# Patient Record
Sex: MALE | Race: WHITE | HISPANIC OR LATINO | Employment: FULL TIME | URBAN - METROPOLITAN AREA
[De-identification: names, ages, dates, MRNs, and addresses within clinical notes are randomized per-mention and may not be internally consistent; named-entity substitution may affect disease eponyms.]

---

## 2021-08-17 DIAGNOSIS — Z96.21 COCHLEAR IMPLANT FOLLOW-UP: Primary | ICD-10-CM

## 2021-08-17 DIAGNOSIS — Z48.89 COCHLEAR IMPLANT FOLLOW-UP: Primary | ICD-10-CM

## 2021-08-18 ENCOUNTER — OFFICE VISIT (OUTPATIENT)
Dept: AUDIOLOGY | Facility: CLINIC | Age: 41
End: 2021-08-18
Payer: COMMERCIAL

## 2021-08-18 DIAGNOSIS — H90.3 SENSORINEURAL HEARING LOSS, BILATERAL: Primary | ICD-10-CM

## 2021-08-18 PROCEDURE — 99207 PR NO CHARGE LOS: CPT | Performed by: AUDIOLOGIST

## 2021-08-18 PROCEDURE — 92557 COMPREHENSIVE HEARING TEST: CPT | Mod: 52 | Performed by: AUDIOLOGIST

## 2021-08-18 PROCEDURE — 92550 TYMPANOMETRY & REFLEX THRESH: CPT | Mod: 52 | Performed by: AUDIOLOGIST

## 2021-08-23 NOTE — PROGRESS NOTES
AUDIOLOGY REPORT    SUBJECTIVE:  Gary Coreas is a 41 year old male who was seen in the Audiology Clinic at the [facility] for audiologic evaluation, referred by Trey Garcia M.D.  The patient reports a history of bilateral hearing loss since he was born. He uses a cochlear implant in his right ear and a Miracle Ear behind-the-ear-hearing aid in his left ear. Gary was surgically implanted with a right Advanced Bionics HiRes 90K cochlear implant by Dr. Eben Mai on 11/27/12. Gary was previously seen at [facility]. He reports he is transferring care to [facility] due to his insurance. Gary states that his cochlear implant processor broke and he lost it a couple of months ago. He is interested in ordering an upgraded processor and getting a new hearing aid that works with his cochlear implant.     OBJECTIVE:  Otoscopic exam indicates ears are clear of cerumen bilaterally     Pure Tone Thresholds assessed using conventional audiometry with good  reliability from 250-8000 Hz bilaterally using circumaural headphones     RIGHT:  profound sensorineural hearing loss (no response at any frequency)    LEFT:    severe sloping to profound rising to moderately-severe sensorineural hearing loss    Tympanogram:    RIGHT: normal eardrum mobility    LEFT:   normal eardrum mobility    Reflexes (reported by stimulus ear):  RIGHT: Ipsilateral is absent at frequencies tested  RIGHT: Contralateral was not tested  LEFT:   Ipsilateral is absent at frequencies tested  LEFT:   Contralateral was not tested      Speech Reception Threshold:    RIGHT: DNT    LEFT:   85 dB HL  Word Recognition Score:     RIGHT: DNT     LEFT:   40% at 100 dB HL using NU-6 recorded word list    Cochlear Implant Upgrade: Gary is interested in upgrading to a new cochlear implant processor for his right ear. A [employer] CI M90 Upgrade order form is started with Gary during the appointment. He selects a  processor in color chestnut (P4). He is unsure what he would like to choose for his accessory option. After Gary speaks with a representative from [employer] he decides he would like to get the Phonak Amalia Link M hearing aid for his left ear as his accessory option.    ASSESSMENT:   Today's results reveal profound sensorineural hearing loss in the right ear and severe to profound rising to moderately-severe sensorineural hearing loss in the left ear. The processor upgrade process is started for his right cochlear implant processor through [employer].    PLAN:  Gary should be scheduled to return for a cochlear implant upgrade programming once his upgraded processor has been received. Please call this clinic with questions regarding these results or recommendations.      Norbert Mena., [facility]-A  [place] Licensed Audiologist #[id number]

## 2022-01-07 NOTE — PROGRESS NOTES
AUDIOLOGY REPORT     SUBJECTIVE: Gary Coreas was seen at the [facility] for a cochlear implant processor upgrade. He is also being fit with a left Phonak Amalia Link M hearing aid which he chose as his free accessory. Gary reports a history of bilateral hearing loss since he was born. Gary was surgically implanted with a right Advanced Bionics HiRes 90K cochlear implant by Dr. Eben Mai on 11/27/12. Gary was previously seen at a [facility]. He is transferring care to [facility] due to his insurance. He previously utilized a cochlear implant at the right ear and a Miracle Ear behind-the-ear-hearing aid at the left ear. Gary has not worn his right processor in 6 months as he lost it.      Carly, the representative from [employer], was present for today's appointment.    OBJECTIVE: Gary was fit with an upgraded right processor then the hearing aid. The cochlear implant and the hearing aid were connected to the software and were programmed in the same session.     Cochlear Implant: Right, Amalia CI M90 Sound Processor (S/N: [id number])  Hearing Aid: Left, Phonak Amalia Link M (S/N: [id number])    External equipment on the right was connected to programming software (magnet strength 4). There is no programming information available in [employer]. Therefore, initial mapping occurred in the [employer] CI software. Input dynamic range was increased to 75. The electrode array was conditioned and electrode impedances were appropriate. Initial programming adjustments were made including loudness growth and loudness balancing of electrodes across the array. Progressive levels of 5 clinical units were enabled.     Visual inspection of the left pinna revealed a sore above the tragus. Gary reports the sore is from his earmold. His current earmold was re-tubed and was trimmed to fit the Phonak Amalia Link M hearing aid. I recommended that Gary let the sore on his  ear heal. He expressed interest in returning for a smaller earmold to be made for the left ear.    Per Carly's recommendation, real ear measures were not performed as a bimodal fitting formula is not available on the [employer]. Instead, the algorithm in the software utilizes the settings and compression ratios that are in the cochlear implant to determine the best fit for the hearing aid. Bluetooth is enabled to stream to the right CI. Manual volume control is active on the left hearing aid. Indicator sounds were played for him. If Gary presses the up button on the right processor, he will be changing the progressive level of the map. Gary reported satisfaction with the sound quality and reported that sound was balanced between his ears. Both devices were connected to his bluetooth and the AB Remote Leon. A phone call was practiced in the office and Gary pressed the buttons on his processor to answer/end the call with success.    Battery life with a standard battery is estimated at 16+ hours. The medium (curved) batteries are back ordered and will be shipped to this clinic when they are available. He is able to keep the standard batteries as a courtesy.     ASSESSMENT: The right Advanced Bionics processor has been upgraded and programmed along with the left Phonak Amalia M Link hearing aid. Gary is satisfied with the sound quality of both devices today. He is not charged for the hearing aid as I was unaware of the fitting procedure and charges when fitting a hearing aid with a cochlear implant. He is billed for cochlear implant programming.       PLAN: Gary is scheduled to return on 2/10/22 for an earmold impression of the left ear. He is encouraged to push back his appointment if his ear has not yet healed. It is recommended that he return for additional programming adjustments in 1-2 months. Please call this clinic at [phone] with questions regarding these results or recommendations.     Liane  Matt Cote, [facility]-A  Clinical Audiologist   [place] #[id number]

## 2022-01-13 ENCOUNTER — OFFICE VISIT (OUTPATIENT)
Dept: AUDIOLOGY | Facility: CLINIC | Age: 42
End: 2022-01-13
Payer: COMMERCIAL

## 2022-01-13 DIAGNOSIS — H90.3 SENSORINEURAL HEARING LOSS, BILATERAL: Primary | ICD-10-CM

## 2022-01-13 PROCEDURE — 92603 COCHLEAR IMPLT F/UP EXAM 7/>: CPT | Performed by: AUDIOLOGIST

## 2022-01-13 PROCEDURE — 99207 PR NO CHARGE LOS: CPT | Performed by: AUDIOLOGIST

## 2022-02-10 ENCOUNTER — OFFICE VISIT (OUTPATIENT)
Dept: AUDIOLOGY | Facility: CLINIC | Age: 42
End: 2022-02-10
Payer: COMMERCIAL

## 2022-02-10 DIAGNOSIS — H90.3 SENSORINEURAL HEARING LOSS, BILATERAL: Primary | ICD-10-CM

## 2022-02-10 PROCEDURE — V5275 EAR IMPRESSION: HCPCS | Mod: LT | Performed by: AUDIOLOGIST

## 2022-02-10 PROCEDURE — 99207 PR NO CHARGE LOS: CPT | Performed by: AUDIOLOGIST

## 2022-02-10 NOTE — PROGRESS NOTES
AUDIOLOGY REPORT    SUBJECTIVE: Gary Coreas, 41 year old year old male, was seen at the [facility] on 02/10/22 for an earmold impression. Gary reports a history of bilateral hearing loss since he was born. Gary was surgically implanted with a right Advanced Bionics HiRes 90K cochlear implant by Dr. Eben Mai on 11/27/12. Gary was previously seen at a [facility]. He is transferring care to [facility] due to his insurance. He previously utilized a cochlear implant at the right ear and a Miracle Ear brand behind-the-ear-hearing aid at the left ear. He was fit with an upgraded Amalia M90 cochlear implant processor at the right ear and a Phonak Amalia Link M hearing aid at the left ear on 1/13/22.     Today, Gary reports that the left earmold is too loose.     OBJECTIVE: Otoscopy revealed a clear left canal. A left earmold impression was taken without incident.     Earmold ordered:   : Unitron  Ear: Left  Material: Silicone  Style: Carved full shell  Vent: Small  Canal length: Long  Addition: Removal cord    ASSESSMENT: Hearing aid check completed. Billed for left earmold impression.     PLAN: Gary is scheduled to return on 3/8/22 with Yanelis Peoples, to be fit with the left earmold. Please call our clinic at [phone] with questions or concerns.    Matt Singer, CCC-A  Clinical Audiologist   [place] #[id number]

## 2022-03-07 NOTE — PROGRESS NOTES
AUDIOLOGY REPORT     SUBJECTIVE: Gary Coreas, 41 year old year old male, was seen at the [facility] on 03/8/22 for an earmold fitting appointment. Gary has a history of bilateral hearing loss since he was born. Currently wears a  Left hearing aid, (Phonak Amalia Link M), and a right cochlear implant (CI), (Amalia CI M90 Sound Processor).      OBJECTIVE: Otoscopy revealed a clear left canal. Fit today with new left earmold. Tubing was trimmed appropriately, patient was pleased with the fit.      Patient is wondering if his CI batteries are in. Checked with another audiologist and our audiology assistant, CI batteries have not yet been received. January 2022 audiology note says batteries are back ordered. Will ship batteries to patient when they arrive.      Earmold:  : Unitron  Ear: Left  Material: Silicone  Style: Carved full shell  Vent: Small  Canal length: Long  Addition: Removal cord     ASSESSMENT: Hearing aid check completed.      PLAN: Gary will return as needed. Please ship CI batteries to Gary when they arrive. Please call our clinic at [phone] with questions or concerns.

## 2022-03-08 ENCOUNTER — OFFICE VISIT (OUTPATIENT)
Dept: AUDIOLOGY | Facility: CLINIC | Age: 42
End: 2022-03-08
Payer: COMMERCIAL

## 2022-03-08 DIAGNOSIS — H90.3 SENSORINEURAL HEARING LOSS, BILATERAL: Primary | ICD-10-CM

## 2022-03-08 PROCEDURE — 99207 PR NO CHARGE LOS: CPT | Performed by: AUDIOLOGIST

## 2022-03-08 PROCEDURE — V5264 EAR MOLD/INSERT: HCPCS | Mod: NU | Performed by: AUDIOLOGIST

## 2023-03-08 ENCOUNTER — ALLIED HEALTH/NURSE VISIT (OUTPATIENT)
Dept: AUDIOLOGY | Facility: CLINIC | Age: 43
End: 2023-03-08
Payer: COMMERCIAL

## 2023-03-08 DIAGNOSIS — H90.3 SENSORINEURAL HEARING LOSS, BILATERAL: Primary | ICD-10-CM

## 2023-03-08 PROCEDURE — V5010 ASSESSMENT FOR HEARING AID: HCPCS | Performed by: AUDIOLOGIST

## 2023-03-08 NOTE — PROGRESS NOTES
"Patient dropped off left hearing aid due to \" ear hook crack at end where ear mold is attached\". Upon visual inspection, the tone hook was broken. A new tone hook was placed on hearing aid and tubing changed. Hearing aid was cleaned, microphones brushed and vacuumed, wax removed from earmold. A new size 13 battery was provided and listening check revealed proper functioning.    Called Gary to let them know the hearing aid is ready for pickup, clinic hours and location were provided.    No charge, hearing aid still in warranty: 1/25/25.     Gabriela Hwang, Audiology Clinic Assistant       I delegated the hearing aid service to the audiology assistant. I approve of the services provided.  Matt Peoples, CCC-A  [place] Licensed Audiologist #[id number]    "

## 2023-04-16 ENCOUNTER — HEALTH MAINTENANCE LETTER (OUTPATIENT)
Age: 43
End: 2023-04-16

## 2023-06-20 ENCOUNTER — OFFICE VISIT (OUTPATIENT)
Dept: AUDIOLOGY | Facility: CLINIC | Age: 43
End: 2023-06-20
Payer: COMMERCIAL

## 2023-06-20 DIAGNOSIS — H90.3 SENSORINEURAL HEARING LOSS, BILATERAL: Primary | ICD-10-CM

## 2023-06-20 PROCEDURE — 92604 REPROGRAM COCHLEAR IMPLT 7/>: CPT | Performed by: AUDIOLOGIST

## 2023-06-20 NOTE — PROGRESS NOTES
AUDIOLOGY REPORT    SUBJECTIVE: Gary Coreas is a 43 year old male was seen in the Audiology Clinic at [facility] on 23. Dr. Eben Mai from [facility] implanted Gary with a right  Advanced Bionics 90K Advantage cochlear implant (CI) on the right side due to moderately-severe to severe sensorineural hearing loss in the left ear and profound rising to moderately-severe sensorineural hearing loss in the right ear and lack of benefit from hearing aids. Gary was implanted on 2012. He transferred care to this clinic in . Gary was fit with a left Phonak Amalia Link M hearing aid and he was upgraded to a right Advanced Bionics Amalia CI M90 sound processor at this clinic on 22.    The patient reports that he would like some manual programs added to his hearing aid and cochlear implant processor. Gary states that he has contacted [employer] to request a replacement rechargeable battery a couple of times but he hasn't received one yet.    OBJECTIVE: The patient came to the clinic for adjustment to the programs in the external speech processor and for assessment of the external components of the cochlear implant system. These components provide power and data to the internal device. Sound is only heard once the external portion is activated. Postoperative treatment, including device fitting and adjustment, audiologic assessments, and training are required at regular intervals. Testing can include electropsychophysical measures of threshold, comfort, and loudness balancing, which are completed to update the program.    Processor type: Right: Amalia CI M90 Sound Processor (S/N: [id number])  Magnet strength: 4  Hearing Aid: Left: Phonak Amalia Link M (S/N: [id number])    TEST RESULTS:   Tympanograms: DNT due to lack of time  Electrode Impedances: stable and within tolerances  Dataloggin.5 hours of use per day  Neural Response Testing: DNT  Strategy  Preference: HiRes Gautier-S    Programs:  1. AutoSense OS 3.0  2. Speech in 360 with direct touch  3. Acoustic phone  4. Music 1    ASSESSMENT: Three manual programs were added to Gary's cochlear implant and hearing aid today. He is able to access the programs in the Advanced Bionics karyn or by pressing and holding the button on his hearing aid or CI. Gary doesn't want any other volume adjustments made to his hearing aid or cochlear implant today. [employer] will be contacted to request a replacement rechargeable battery for him.    PLAN: Gary was seen for cochlear implant programming today. He will return for speech perception testing with his cochlear implant and hearing aid on 9/5/23 or sooner if concerns arise. Please call this clinic with questions regarding these results or recommendations.    Norbert Mena., [facility])-A  [place] Licensed Audiologist #[id number]

## 2023-09-05 ENCOUNTER — OFFICE VISIT (OUTPATIENT)
Dept: AUDIOLOGY | Facility: CLINIC | Age: 43
End: 2023-09-05
Payer: COMMERCIAL

## 2023-09-05 DIAGNOSIS — H90.3 SENSORINEURAL HEARING LOSS, BILATERAL: Primary | ICD-10-CM

## 2023-09-05 PROCEDURE — 92567 TYMPANOMETRY: CPT | Performed by: AUDIOLOGIST

## 2023-09-05 PROCEDURE — 92626 EVAL AUD FUNCJ 1ST HOUR: CPT | Performed by: AUDIOLOGIST

## 2023-09-05 PROCEDURE — 92553 AUDIOMETRY AIR & BONE: CPT | Mod: 52 | Performed by: AUDIOLOGIST

## 2023-09-05 NOTE — PROGRESS NOTES
AUDIOLOGY REPORT    SUBJECTIVE:Gary Coreas is a 43 year old male who was seen in the Audiology Clinic at [facility] on September 5, 2023. Dr. Eben Mai from [facility] implanted Gary with a right  Advanced Bionics 90K Advantage cochlear implant (CI) on the right side due to moderately-severe to severe sensorineural hearing loss in the left ear and profound rising to moderately-severe sensorineural hearing loss in the right ear and lack of benefit from hearing aids. Gary was implanted on 11/27/2012. He transferred care to this clinic in 2021. Gary was fit with a left Phonak Amalia Link M hearing aid and he was upgraded to a right Advanced Bionics Amalia CI M90 sound processor at this clinic on 1/13/22.     The patient reports that his hearing aid and cochlear implant have been working well since his last adjustment on 6/20/23. He denies issues with his hearing aid or cochlear implant. He reports that he received replacement batteries for his cochlear implant.    OBJECTIVE:    Speech perception testing is conducted at regular intervals to determine the degree of benefit the patient is obtaining from the cochlear implant. Tests are conducted using the cochlear implant without the benefit of lipreading. All tests are conducted in a sound-treated room. Perception of monosyllabic words and words in sentences are tested. Results usually show improvement over time. A decrease in performance indicates the need for re-programming of the prosthesis and/or replacement of component    INTERVAL: 10.5 years     45 minutes were spent assessing the patient s auditory rehabilitation status.    Device(s) used for Testing:   Right ear: Advanced Bionics Amalia CI M90  Left ear: Phonak Amalia Link M    Soundfield Aided Thresholds: Left: detection in the mild sloping to profound rising to severe hearing loss range, Right: detection in the normal to borderline-normal hearing  range  Unaided Thresholds: Left: severe sloping to profound rising to moderately-severe sensorineural hearing loss, Right: did not test due to no responses in that ear documented on 8/18/21.  Tympanograms: normal eardrum mobility bilaterally.    CNC Words Test:  The patient repeats 25 single syllable words, auditory only. The words are presented at 60 dB A (conversational level) delivered from a CD player.    Preoperative Performance:  Left ear aided: 24%  Right ear aided: 18%  Bilaterally aided: 28%    3 months Post-Activation of CI:   Right: 26%    6 months Post-Activation of CI:   Right: 26%    12 months Post-Activation of CI:   Right: 44%    10.5 Years Post-Activation of CI:   Left: 20%  Right: 84%  Bilateral/Bimodal: 84%    AzBio Sentences Test:  The patient repeats 20 sentences, auditory only.  The sentences are presented at 60 dB A (conversational level) delivered from a CD player.     Preoperative Performance:  Left ear aided: 59% in quiet, 25% with +5 SNR  Right ear aided: 39% in quiet, 12% with +5 SNR  Bilaterally aided: 65% in quiet, 15% with +5 SNR    3 months Post-Activation of CI:   Right: 51% in quiet, 10% with +5 SNR  Bilateral/Bimodal: 78% in quiet, 39% with +5 SNR    6 months Post-Activation of CI:   Right: 44% in quiet, 10% with +5 SNR  Bilateral/Bimodal: 83% in quiet, 34% with +5 SNR    12 months Post-Activation of CI:   Right: 62% in quiet, 20% with +5 SNR  Bilateral/Bimodal: 82% in quiet, 50% with +5 SNR    10.5 years Post-Activation of CI:   Right: 62% in quiet, 34% with +5 SNR  Left: 36% in quiet  Bilateral/Bimodal: 82% in quiet, 33% with +5 SNR    COMMENTS: Due to the good das performance, no programming changes were advised.    ASSESSMENT:   -Full time user of right CI and left hearing aid.   -CNC scores have improved and AzBio scores have remained stable in quiet compared to previous tests. AzBio scores with noise improved in the CI only condition and declined in the bimodal  condition.  -Stable left hearing compared to results from 8/18/21.      PLAN: The patient will return for speech perception testing in 1-2 years. Gary should return for cochlear implant programming and hearing aid checks as needed. The patient expressed understanding and agreement with this plan.      Norbert Mena., [facility]-A  [place] Licensed Audiologist #[id number]

## 2024-06-23 ENCOUNTER — HEALTH MAINTENANCE LETTER (OUTPATIENT)
Age: 44
End: 2024-06-23

## 2024-07-31 ENCOUNTER — ALLIED HEALTH/NURSE VISIT (OUTPATIENT)
Dept: AUDIOLOGY | Facility: CLINIC | Age: 44
End: 2024-07-31
Payer: COMMERCIAL

## 2024-07-31 DIAGNOSIS — H90.3 SENSORINEURAL HEARING LOSS, BILATERAL: Primary | ICD-10-CM

## 2024-07-31 PROCEDURE — V5299 HEARING SERVICE: HCPCS | Performed by: AUDIOLOGIST

## 2024-07-31 NOTE — PROGRESS NOTES
"HEARING AID DROP OFF    Left Phonak Amalia KNUTSON hearing aid dropped off with complaint, \"the sound is muffled most of the time, then the sound clears for a few seconds and then back to muffled.\"    Visual inspection shows the tubing is very hard. Replaced tone hook, tubing, and battery. Vacuumed microphone ports. Listening check revealed proper function of the hearing aid.     Called patient and left voicemail, informed him the hearing aid is ready for . Sent [facility] [employer] [facility] message as well.     Matt Peoples, CCC-A  [place] Licensed Audiologist #[id number]   "

## 2025-07-12 ENCOUNTER — HEALTH MAINTENANCE LETTER (OUTPATIENT)
Age: 45
End: 2025-07-12

## 2025-08-25 ENCOUNTER — DOCUMENTATION ONLY (OUTPATIENT)
Dept: AUDIOLOGY | Facility: CLINIC | Age: 45
End: 2025-08-25

## 2025-08-25 ENCOUNTER — ALLIED HEALTH/NURSE VISIT (OUTPATIENT)
Dept: AUDIOLOGY | Facility: CLINIC | Age: 45
End: 2025-08-25
Payer: COMMERCIAL